# Patient Record
Sex: MALE | Race: WHITE | HISPANIC OR LATINO | URBAN - METROPOLITAN AREA
[De-identification: names, ages, dates, MRNs, and addresses within clinical notes are randomized per-mention and may not be internally consistent; named-entity substitution may affect disease eponyms.]

---

## 2017-01-06 ENCOUNTER — OUTPATIENT (OUTPATIENT)
Dept: OUTPATIENT SERVICES | Facility: HOSPITAL | Age: 62
LOS: 1 days | End: 2017-01-06
Payer: MEDICARE

## 2017-01-06 ENCOUNTER — APPOINTMENT (OUTPATIENT)
Age: 62
End: 2017-01-06

## 2017-01-06 DIAGNOSIS — Z94.4 LIVER TRANSPLANT STATUS: ICD-10-CM

## 2017-01-06 DIAGNOSIS — Z98.89 OTHER SPECIFIED POSTPROCEDURAL STATES: Chronic | ICD-10-CM

## 2017-01-06 PROCEDURE — 43235 EGD DIAGNOSTIC BRUSH WASH: CPT | Mod: GC

## 2017-01-06 PROCEDURE — 45378 DIAGNOSTIC COLONOSCOPY: CPT

## 2017-01-06 PROCEDURE — 43235 EGD DIAGNOSTIC BRUSH WASH: CPT

## 2017-01-06 PROCEDURE — 45378 DIAGNOSTIC COLONOSCOPY: CPT | Mod: GC

## 2017-01-28 ENCOUNTER — TRANSCRIPTION ENCOUNTER (OUTPATIENT)
Age: 62
End: 2017-01-28

## 2017-03-27 ENCOUNTER — APPOINTMENT (OUTPATIENT)
Dept: ENDOCRINOLOGY | Facility: CLINIC | Age: 62
End: 2017-03-27

## 2017-03-27 VITALS
SYSTOLIC BLOOD PRESSURE: 140 MMHG | HEIGHT: 68 IN | WEIGHT: 190 LBS | BODY MASS INDEX: 28.79 KG/M2 | OXYGEN SATURATION: 97 % | HEART RATE: 68 BPM | DIASTOLIC BLOOD PRESSURE: 70 MMHG

## 2017-03-27 LAB
GLUCOSE BLDC GLUCOMTR-MCNC: 202
HBA1C MFR BLD HPLC: 8.1

## 2017-06-30 ENCOUNTER — MEDICATION RENEWAL (OUTPATIENT)
Age: 62
End: 2017-06-30

## 2017-07-24 ENCOUNTER — LABORATORY RESULT (OUTPATIENT)
Age: 62
End: 2017-07-24

## 2017-07-24 ENCOUNTER — APPOINTMENT (OUTPATIENT)
Dept: ENDOCRINOLOGY | Facility: CLINIC | Age: 62
End: 2017-07-24

## 2017-07-24 VITALS
OXYGEN SATURATION: 97 % | BODY MASS INDEX: 29.1 KG/M2 | DIASTOLIC BLOOD PRESSURE: 70 MMHG | HEART RATE: 70 BPM | SYSTOLIC BLOOD PRESSURE: 130 MMHG | WEIGHT: 192 LBS | HEIGHT: 68 IN

## 2017-07-24 LAB
GLUCOSE BLDC GLUCOMTR-MCNC: 252
HBA1C MFR BLD HPLC: 9.3
HBA1C MFR BLD HPLC: 9.3

## 2017-07-28 LAB
ALBUMIN SERPL ELPH-MCNC: 4.4 G/DL
ALP BLD-CCNC: 117 U/L
ALT SERPL-CCNC: 21 U/L
ANION GAP SERPL CALC-SCNC: 12 MMOL/L
AST SERPL-CCNC: 25 U/L
BILIRUB SERPL-MCNC: 0.5 MG/DL
BUN SERPL-MCNC: 34 MG/DL
CALCIUM SERPL-MCNC: 9.6 MG/DL
CHLORIDE SERPL-SCNC: 103 MMOL/L
CHOLEST SERPL-MCNC: 247 MG/DL
CHOLEST/HDLC SERPL: 6.2 RATIO
CO2 SERPL-SCNC: 23 MMOL/L
CREAT SERPL-MCNC: 2.05 MG/DL
CREAT SPEC-SCNC: 107 MG/DL
GLUCOSE SERPL-MCNC: 300 MG/DL
HDLC SERPL-MCNC: 40 MG/DL
LDLC SERPL CALC-MCNC: 130 MG/DL
MICROALBUMIN 24H UR DL<=1MG/L-MCNC: 6.9 MG/DL
MICROALBUMIN/CREAT 24H UR-RTO: 64 MG/G
POTASSIUM SERPL-SCNC: 5.5 MMOL/L
PROT SERPL-MCNC: 7.7 G/DL
SODIUM SERPL-SCNC: 138 MMOL/L
T3RU NFR SERPL: 0.93 INDEX
T4 SERPL-MCNC: 6.6 UG/DL
TRIGL SERPL-MCNC: 387 MG/DL
TSH SERPL-ACNC: 0.52 UIU/ML

## 2017-10-19 ENCOUNTER — APPOINTMENT (OUTPATIENT)
Dept: ENDOCRINOLOGY | Facility: CLINIC | Age: 62
End: 2017-10-19
Payer: MEDICARE

## 2017-10-19 VITALS
DIASTOLIC BLOOD PRESSURE: 78 MMHG | OXYGEN SATURATION: 98 % | HEART RATE: 72 BPM | WEIGHT: 187 LBS | HEIGHT: 68 IN | BODY MASS INDEX: 28.34 KG/M2 | SYSTOLIC BLOOD PRESSURE: 130 MMHG

## 2017-10-19 LAB
GLUCOSE BLDC GLUCOMTR-MCNC: 97
HBA1C MFR BLD HPLC: 7.3

## 2017-10-19 PROCEDURE — 83036 HEMOGLOBIN GLYCOSYLATED A1C: CPT | Mod: QW

## 2017-10-19 PROCEDURE — 82962 GLUCOSE BLOOD TEST: CPT

## 2017-10-19 PROCEDURE — 99214 OFFICE O/P EST MOD 30 MIN: CPT | Mod: 25

## 2017-11-20 ENCOUNTER — APPOINTMENT (OUTPATIENT)
Dept: GASTROENTEROLOGY | Facility: CLINIC | Age: 62
End: 2017-11-20
Payer: MEDICARE

## 2017-11-20 VITALS
OXYGEN SATURATION: 98 % | BODY MASS INDEX: 28.19 KG/M2 | HEART RATE: 64 BPM | WEIGHT: 186 LBS | TEMPERATURE: 97.9 F | SYSTOLIC BLOOD PRESSURE: 124 MMHG | DIASTOLIC BLOOD PRESSURE: 70 MMHG | HEIGHT: 68 IN | RESPIRATION RATE: 15 BRPM

## 2017-11-20 DIAGNOSIS — K92.1 MELENA: ICD-10-CM

## 2017-11-20 PROCEDURE — 99203 OFFICE O/P NEW LOW 30 MIN: CPT

## 2017-12-18 ENCOUNTER — APPOINTMENT (OUTPATIENT)
Dept: GASTROENTEROLOGY | Facility: CLINIC | Age: 62
End: 2017-12-18

## 2017-12-21 ENCOUNTER — RX RENEWAL (OUTPATIENT)
Age: 62
End: 2017-12-21

## 2018-01-11 ENCOUNTER — APPOINTMENT (OUTPATIENT)
Dept: ENDOCRINOLOGY | Facility: CLINIC | Age: 63
End: 2018-01-11
Payer: MEDICARE

## 2018-01-11 VITALS
HEART RATE: 72 BPM | HEIGHT: 68 IN | WEIGHT: 185 LBS | DIASTOLIC BLOOD PRESSURE: 80 MMHG | BODY MASS INDEX: 28.04 KG/M2 | SYSTOLIC BLOOD PRESSURE: 110 MMHG | OXYGEN SATURATION: 98 %

## 2018-01-11 LAB
GLUCOSE BLDC GLUCOMTR-MCNC: 121
HBA1C MFR BLD HPLC: 5.1

## 2018-01-11 PROCEDURE — 99214 OFFICE O/P EST MOD 30 MIN: CPT | Mod: 25

## 2018-01-11 PROCEDURE — 82962 GLUCOSE BLOOD TEST: CPT

## 2018-01-11 PROCEDURE — 83036 HEMOGLOBIN GLYCOSYLATED A1C: CPT | Mod: QW

## 2018-01-25 ENCOUNTER — APPOINTMENT (OUTPATIENT)
Age: 63
End: 2018-01-25
Payer: MEDICARE

## 2018-01-25 VITALS
TEMPERATURE: 98.3 F | RESPIRATION RATE: 16 BRPM | WEIGHT: 180 LBS | BODY MASS INDEX: 27.28 KG/M2 | DIASTOLIC BLOOD PRESSURE: 101 MMHG | HEART RATE: 71 BPM | HEIGHT: 68 IN | SYSTOLIC BLOOD PRESSURE: 150 MMHG

## 2018-01-25 DIAGNOSIS — Z87.19 PERSONAL HISTORY OF OTHER DISEASES OF THE DIGESTIVE SYSTEM: ICD-10-CM

## 2018-01-25 DIAGNOSIS — K72.90 HEPATIC FAILURE, UNSPECIFIED W/OUT COMA: ICD-10-CM

## 2018-01-25 PROCEDURE — 99214 OFFICE O/P EST MOD 30 MIN: CPT

## 2018-02-07 ENCOUNTER — APPOINTMENT (OUTPATIENT)
Age: 63
End: 2018-02-07

## 2018-02-12 ENCOUNTER — APPOINTMENT (OUTPATIENT)
Dept: GASTROENTEROLOGY | Facility: CLINIC | Age: 63
End: 2018-02-12
Payer: MEDICARE

## 2018-02-12 VITALS
OXYGEN SATURATION: 98 % | BODY MASS INDEX: 27.74 KG/M2 | DIASTOLIC BLOOD PRESSURE: 70 MMHG | RESPIRATION RATE: 16 BRPM | TEMPERATURE: 98.4 F | HEART RATE: 69 BPM | SYSTOLIC BLOOD PRESSURE: 130 MMHG | HEIGHT: 68 IN | WEIGHT: 183 LBS

## 2018-02-12 DIAGNOSIS — D64.9 ANEMIA, UNSPECIFIED: ICD-10-CM

## 2018-02-12 PROCEDURE — 91110 GI TRC IMG INTRAL ESOPH-ILE: CPT

## 2018-02-14 ENCOUNTER — RX RENEWAL (OUTPATIENT)
Age: 63
End: 2018-02-14

## 2018-02-14 ENCOUNTER — RESULT REVIEW (OUTPATIENT)
Age: 63
End: 2018-02-14

## 2018-02-20 ENCOUNTER — OTHER (OUTPATIENT)
Age: 63
End: 2018-02-20

## 2018-03-20 ENCOUNTER — APPOINTMENT (OUTPATIENT)
Dept: ENDOCRINOLOGY | Facility: CLINIC | Age: 63
End: 2018-03-20
Payer: MEDICARE

## 2018-03-20 VITALS
SYSTOLIC BLOOD PRESSURE: 126 MMHG | OXYGEN SATURATION: 98 % | BODY MASS INDEX: 28.95 KG/M2 | HEIGHT: 68 IN | WEIGHT: 191 LBS | HEART RATE: 71 BPM | DIASTOLIC BLOOD PRESSURE: 78 MMHG

## 2018-03-20 DIAGNOSIS — E11.65 TYPE 2 DIABETES MELLITUS WITH HYPERGLYCEMIA: ICD-10-CM

## 2018-03-20 PROCEDURE — 99214 OFFICE O/P EST MOD 30 MIN: CPT

## 2018-05-10 ENCOUNTER — APPOINTMENT (OUTPATIENT)
Dept: HEPATOLOGY | Facility: CLINIC | Age: 63
End: 2018-05-10

## 2018-07-16 ENCOUNTER — APPOINTMENT (OUTPATIENT)
Dept: ENDOCRINOLOGY | Facility: CLINIC | Age: 63
End: 2018-07-16
Payer: MEDICARE

## 2018-07-16 VITALS — SYSTOLIC BLOOD PRESSURE: 124 MMHG | DIASTOLIC BLOOD PRESSURE: 76 MMHG | OXYGEN SATURATION: 96 % | HEART RATE: 74 BPM

## 2018-07-16 VITALS — WEIGHT: 190 LBS | HEIGHT: 68 IN | BODY MASS INDEX: 28.79 KG/M2

## 2018-07-16 LAB
GLUCOSE BLDC GLUCOMTR-MCNC: 169
HBA1C MFR BLD HPLC: 6

## 2018-07-16 PROCEDURE — 83036 HEMOGLOBIN GLYCOSYLATED A1C: CPT | Mod: QW,GC

## 2018-07-16 PROCEDURE — 99214 OFFICE O/P EST MOD 30 MIN: CPT | Mod: 25,GC

## 2018-07-16 PROCEDURE — 95250 CONT GLUC MNTR PHYS/QHP EQP: CPT | Mod: GC

## 2018-07-16 PROCEDURE — 95251 CONT GLUC MNTR ANALYSIS I&R: CPT | Mod: GC

## 2018-07-16 PROCEDURE — 82962 GLUCOSE BLOOD TEST: CPT | Mod: GC

## 2018-07-16 RX ORDER — INSULIN GLARGINE 100 [IU]/ML
100 INJECTION, SOLUTION SUBCUTANEOUS
Qty: 15 | Refills: 1 | Status: DISCONTINUED | COMMUNITY
Start: 2017-07-28 | End: 2018-07-16

## 2018-07-24 LAB
ALBUMIN SERPL ELPH-MCNC: 4.3 G/DL
ALP BLD-CCNC: 111 U/L
ALT SERPL-CCNC: 17 U/L
ANION GAP SERPL CALC-SCNC: 11 MMOL/L
AST SERPL-CCNC: 23 U/L
BILIRUB SERPL-MCNC: 0.3 MG/DL
BUN SERPL-MCNC: 26 MG/DL
CALCIUM SERPL-MCNC: 9.1 MG/DL
CHLORIDE SERPL-SCNC: 108 MMOL/L
CHOLEST SERPL-MCNC: 271 MG/DL
CHOLEST/HDLC SERPL: 7.1 RATIO
CO2 SERPL-SCNC: 22 MMOL/L
CREAT SERPL-MCNC: 1.99 MG/DL
CREAT SPEC-SCNC: 174 MG/DL
GLUCOSE SERPL-MCNC: 80 MG/DL
HDLC SERPL-MCNC: 38 MG/DL
LDLC SERPL CALC-MCNC: 180 MG/DL
MICROALBUMIN 24H UR DL<=1MG/L-MCNC: 143.7 MG/DL
MICROALBUMIN/CREAT 24H UR-RTO: 825 MG/G
POTASSIUM SERPL-SCNC: 4.8 MMOL/L
PROT SERPL-MCNC: 6.9 G/DL
SODIUM SERPL-SCNC: 141 MMOL/L
TRIGL SERPL-MCNC: 263 MG/DL
TSH SERPL-ACNC: 0.94 UIU/ML

## 2018-08-03 ENCOUNTER — CLINICAL ADVICE (OUTPATIENT)
Age: 63
End: 2018-08-03

## 2018-10-29 ENCOUNTER — APPOINTMENT (OUTPATIENT)
Dept: ENDOCRINOLOGY | Facility: CLINIC | Age: 63
End: 2018-10-29
Payer: MEDICARE

## 2018-10-29 VITALS
HEIGHT: 68 IN | OXYGEN SATURATION: 96 % | WEIGHT: 185 LBS | SYSTOLIC BLOOD PRESSURE: 116 MMHG | HEART RATE: 78 BPM | BODY MASS INDEX: 28.04 KG/M2 | DIASTOLIC BLOOD PRESSURE: 80 MMHG

## 2018-10-29 LAB
GLUCOSE BLDC GLUCOMTR-MCNC: 94
HBA1C MFR BLD HPLC: 5.9

## 2018-10-29 PROCEDURE — 99214 OFFICE O/P EST MOD 30 MIN: CPT | Mod: 25

## 2018-10-29 PROCEDURE — G0008: CPT

## 2018-10-29 PROCEDURE — 90686 IIV4 VACC NO PRSV 0.5 ML IM: CPT

## 2018-10-29 PROCEDURE — 83036 HEMOGLOBIN GLYCOSYLATED A1C: CPT | Mod: QW

## 2018-10-29 PROCEDURE — 82962 GLUCOSE BLOOD TEST: CPT

## 2019-01-04 ENCOUNTER — RX RENEWAL (OUTPATIENT)
Age: 64
End: 2019-01-04

## 2019-02-04 ENCOUNTER — RESULT CHARGE (OUTPATIENT)
Age: 64
End: 2019-02-04

## 2019-02-04 ENCOUNTER — APPOINTMENT (OUTPATIENT)
Dept: ENDOCRINOLOGY | Facility: CLINIC | Age: 64
End: 2019-02-04
Payer: MEDICARE

## 2019-02-04 VITALS
WEIGHT: 187 LBS | BODY MASS INDEX: 28.34 KG/M2 | HEIGHT: 68 IN | HEART RATE: 85 BPM | DIASTOLIC BLOOD PRESSURE: 62 MMHG | SYSTOLIC BLOOD PRESSURE: 124 MMHG | OXYGEN SATURATION: 98 %

## 2019-02-04 LAB
GLUCOSE BLDC GLUCOMTR-MCNC: 235
HBA1C MFR BLD HPLC: 5.9

## 2019-02-04 PROCEDURE — 82962 GLUCOSE BLOOD TEST: CPT

## 2019-02-04 PROCEDURE — 99214 OFFICE O/P EST MOD 30 MIN: CPT

## 2019-02-04 PROCEDURE — 83036 HEMOGLOBIN GLYCOSYLATED A1C: CPT | Mod: QW

## 2019-02-07 LAB
CHOLEST SERPL-MCNC: 231 MG/DL
CHOLEST/HDLC SERPL: 6.1 RATIO
HDLC SERPL-MCNC: 38 MG/DL
LDLC SERPL CALC-MCNC: 128 MG/DL
TRIGL SERPL-MCNC: 324 MG/DL

## 2019-05-13 ENCOUNTER — APPOINTMENT (OUTPATIENT)
Dept: ENDOCRINOLOGY | Facility: CLINIC | Age: 64
End: 2019-05-13
Payer: MEDICARE

## 2019-05-13 VITALS
OXYGEN SATURATION: 98 % | HEART RATE: 68 BPM | WEIGHT: 190 LBS | SYSTOLIC BLOOD PRESSURE: 130 MMHG | BODY MASS INDEX: 28.79 KG/M2 | DIASTOLIC BLOOD PRESSURE: 70 MMHG | HEIGHT: 68 IN

## 2019-05-13 LAB
GLUCOSE BLDC GLUCOMTR-MCNC: 89
HBA1C MFR BLD HPLC: 6.1

## 2019-05-13 PROCEDURE — 82962 GLUCOSE BLOOD TEST: CPT

## 2019-05-13 PROCEDURE — 99214 OFFICE O/P EST MOD 30 MIN: CPT | Mod: 25

## 2019-05-13 PROCEDURE — 83036 HEMOGLOBIN GLYCOSYLATED A1C: CPT | Mod: QW

## 2019-05-13 RX ORDER — BLOOD SUGAR DIAGNOSTIC
STRIP MISCELLANEOUS
Qty: 100 | Refills: 3 | Status: ACTIVE | COMMUNITY
Start: 2019-05-13 | End: 1900-01-01

## 2019-05-16 LAB
ANION GAP SERPL CALC-SCNC: 11 MMOL/L
BUN SERPL-MCNC: 31 MG/DL
CALCIUM SERPL-MCNC: 9.7 MG/DL
CHLORIDE SERPL-SCNC: 107 MMOL/L
CHOLEST SERPL-MCNC: 229 MG/DL
CHOLEST/HDLC SERPL: 4.4 RATIO
CO2 SERPL-SCNC: 22 MMOL/L
CREAT SERPL-MCNC: 2.22 MG/DL
GLUCOSE SERPL-MCNC: 90 MG/DL
HDLC SERPL-MCNC: 52 MG/DL
LDLC SERPL CALC-MCNC: 149 MG/DL
POTASSIUM SERPL-SCNC: 5.3 MMOL/L
SODIUM SERPL-SCNC: 140 MMOL/L
TRIGL SERPL-MCNC: 138 MG/DL
TSH SERPL-ACNC: 0.85 UIU/ML

## 2019-08-13 ENCOUNTER — APPOINTMENT (OUTPATIENT)
Dept: ENDOCRINOLOGY | Facility: CLINIC | Age: 64
End: 2019-08-13
Payer: MEDICARE

## 2019-08-13 VITALS
OXYGEN SATURATION: 98 % | DIASTOLIC BLOOD PRESSURE: 76 MMHG | BODY MASS INDEX: 27.74 KG/M2 | HEART RATE: 70 BPM | WEIGHT: 183 LBS | SYSTOLIC BLOOD PRESSURE: 124 MMHG | HEIGHT: 68 IN

## 2019-08-13 DIAGNOSIS — Z94.4 LIVER TRANSPLANT STATUS: ICD-10-CM

## 2019-08-13 PROCEDURE — 83036 HEMOGLOBIN GLYCOSYLATED A1C: CPT | Mod: QW

## 2019-08-13 PROCEDURE — 99214 OFFICE O/P EST MOD 30 MIN: CPT

## 2019-08-13 RX ORDER — PRAVASTATIN SODIUM 40 MG/1
40 TABLET ORAL DAILY
Qty: 90 | Refills: 3 | Status: DISCONTINUED | COMMUNITY
Start: 2018-10-05 | End: 2019-08-13

## 2019-08-14 LAB — HBA1C MFR BLD HPLC: 6.6

## 2019-08-14 NOTE — DATA REVIEWED
[FreeTextEntry1] : poct hba1c 6.6\par \par Pt reports recent cr 2.4\par \par \par 10/2018\par cr 2.16\par \par \par Thyroid ultrasound 7/2018 - stable b/l nodules\par \par FNA 2016: benign findings, [facility]

## 2019-08-14 NOTE — ASSESSMENT
[FreeTextEntry1] : 63 year old man post liver transplant with pre-existing T2 diabetes, well controlled.\par  - continue Januvia.  \par - retinopathy screening up to date \par  - increase frequency of glucose monitoring\par \par \par HTN\par - BP at goal, microalbumin pos,  Has ckd, followed by nephrology, unsure if on  ACE/ARB, will call back with med list ( has had cough)\par \par \par Hyperlipidemia\par   - LDL elevated on last blood tests, will switch to atorvastatin\par - repeat lipids next visit\par \par  Thyroid nodules\par - stable on ultrasound, benign fna in the past.   \par   - repeat ultrasound ~6 months\par  - clinically and biochemically euthyroid\par \par F/u 3 months\par

## 2019-08-14 NOTE — REVIEW OF SYSTEMS
[Shortness Of Breath] : shortness of breath [Chest Pain] : no chest pain [FreeTextEntry4] : + cough [Nausea] : no nausea

## 2019-08-14 NOTE — HISTORY OF PRESENT ILLNESS
[FreeTextEntry1] : cc: Diabetes\par \par  63 year old man with T2DM, well controlled, post liver transplant. He checks glucose every few days in am and values are generally  100 - 120 mg/dl, does not check later in the day. No hypoglycemia. Takes Januvia.  Has cut down on  carbohydrate intake  Exercise:  going to the gym every day, lifts weights. BUt was not able to for the past few weeks, first had epidural injection in his back, then had cataract surgery. Last optho visit this month, just had cataract surgery, no known retinopathy. , has neuropathy. \par \par Thyroid nodules: Benign FNA in the past.    He reports no change in his thyroid and no dysphagia or dyspnea.  \par \par Hypertension: blood pressure has been controlled, He has  proteinuria, still unsure if taking ACE/ARB, did not bring list, has regular nephrology f/u\par \par Hyperlipidemia:taking pravastatin, spoke to his hepatologist who said it is ok to switch to atorvastatin

## 2019-08-14 NOTE — PHYSICAL EXAM
[Alert] : alert [No Acute Distress] : no acute distress [Well Nourished] : well nourished [Well Developed] : well developed [Normal Sclera/Conjunctiva] : normal sclera/conjunctiva [No Proptosis] : no proptosis [No LAD] : no lymphadenopathy [No Respiratory Distress] : no respiratory distress [Clear to Auscultation] : lungs were clear to auscultation bilaterally [Regular Rhythm] : with a regular rhythm [Normal S1, S2] : normal S1 and S2 [Not Tender] : non-tender [Normal Bowel Sounds] : normal bowel sounds [Soft] : abdomen soft [No Spinal Tenderness] : no spinal tenderness [Normal Strength/Tone] : muscle strength and tone were normal [Normal Reflexes] : deep tendon reflexes were 2+ and symmetric [No Tremors] : no tremors [Normal Affect] : the affect was normal [Normal Mood] : the mood was normal [de-identified] : + nodules [de-identified] : left ankle edema

## 2019-08-15 ENCOUNTER — RX RENEWAL (OUTPATIENT)
Age: 64
End: 2019-08-15

## 2019-08-15 RX ORDER — PEN NEEDLE, DIABETIC 29 G X1/2"
31G X 5 MM NEEDLE, DISPOSABLE MISCELLANEOUS
Qty: 1 | Refills: 3 | Status: DISCONTINUED | COMMUNITY
Start: 2017-07-28 | End: 2019-08-15

## 2019-08-16 ENCOUNTER — RX CHANGE (OUTPATIENT)
Age: 64
End: 2019-08-16

## 2019-08-16 RX ORDER — ASPIRIN ENTERIC COATED TABLETS 81 MG 81 MG/1
81 TABLET, DELAYED RELEASE ORAL
Qty: 30 | Refills: 5 | Status: ACTIVE | COMMUNITY
Start: 2019-08-16

## 2019-08-16 RX ORDER — FUROSEMIDE 40 MG/1
40 TABLET ORAL DAILY
Refills: 0 | Status: ACTIVE | COMMUNITY

## 2019-08-16 RX ORDER — EVEROLIMUS 0.5 MG/1
0.5 TABLET ORAL
Refills: 0 | Status: ACTIVE | COMMUNITY
Start: 2019-08-16

## 2019-08-16 RX ORDER — IBUPROFEN 200 MG
600 CAPSULE ORAL DAILY
Refills: 0 | Status: ACTIVE | COMMUNITY
Start: 2019-08-16

## 2019-08-16 RX ORDER — FOLIC ACID 1 MG/1
1 TABLET ORAL DAILY
Refills: 0 | Status: ACTIVE | COMMUNITY
Start: 2019-08-16

## 2019-08-16 RX ORDER — HYDRALAZINE HYDROCHLORIDE 50 MG/1
50 TABLET ORAL 3 TIMES DAILY
Refills: 0 | Status: ACTIVE | COMMUNITY

## 2019-09-17 ENCOUNTER — INBOUND DOCUMENT (OUTPATIENT)
Age: 64
End: 2019-09-17

## 2019-11-19 ENCOUNTER — APPOINTMENT (OUTPATIENT)
Dept: ENDOCRINOLOGY | Facility: CLINIC | Age: 64
End: 2019-11-19
Payer: MEDICARE

## 2019-11-19 VITALS
WEIGHT: 188 LBS | BODY MASS INDEX: 28.49 KG/M2 | HEART RATE: 60 BPM | DIASTOLIC BLOOD PRESSURE: 70 MMHG | SYSTOLIC BLOOD PRESSURE: 150 MMHG | HEIGHT: 68 IN | OXYGEN SATURATION: 98 %

## 2019-11-19 LAB
GLUCOSE BLDC GLUCOMTR-MCNC: 101
HBA1C MFR BLD HPLC: 6.1

## 2019-11-19 PROCEDURE — 83036 HEMOGLOBIN GLYCOSYLATED A1C: CPT | Mod: QW

## 2019-11-19 PROCEDURE — 82962 GLUCOSE BLOOD TEST: CPT

## 2019-11-19 PROCEDURE — 99214 OFFICE O/P EST MOD 30 MIN: CPT | Mod: 25

## 2019-11-20 LAB
ALBUMIN SERPL ELPH-MCNC: 4.6 G/DL
ALP BLD-CCNC: 88 U/L
ALT SERPL-CCNC: 9 U/L
ANION GAP SERPL CALC-SCNC: 15 MMOL/L
AST SERPL-CCNC: 16 U/L
BILIRUB SERPL-MCNC: 0.4 MG/DL
BUN SERPL-MCNC: 41 MG/DL
CALCIUM SERPL-MCNC: 9.7 MG/DL
CHLORIDE SERPL-SCNC: 107 MMOL/L
CHOLEST SERPL-MCNC: 182 MG/DL
CHOLEST/HDLC SERPL: 5.1 RATIO
CO2 SERPL-SCNC: 23 MMOL/L
CREAT SERPL-MCNC: 2.91 MG/DL
GLUCOSE SERPL-MCNC: 93 MG/DL
HDLC SERPL-MCNC: 36 MG/DL
LDLC SERPL CALC-MCNC: 100 MG/DL
POTASSIUM SERPL-SCNC: 4.3 MMOL/L
PROT SERPL-MCNC: 6.6 G/DL
SODIUM SERPL-SCNC: 145 MMOL/L
TRIGL SERPL-MCNC: 228 MG/DL

## 2019-11-20 NOTE — HISTORY OF PRESENT ILLNESS
[FreeTextEntry1] : cc: Diabetes\par \par  64 year old man with T2DM, well controlled, post liver transplant. He checks glucose twice daily, finds that glucose been a little higher than usual in the morning (forgot his log today).  Fasting glucose has been about 130 mg/dl and pm 150 - 160 mg/dl. No hypoglycemia.. No hypoglycemia.  No recent change in diet or exercise. Trying to limit carbohydrate intake. Takes Januvia. Goes to the gym every day, lifts weights.  Last optho visit about 6 months ago, has appt later this week. no known retinopathy. , has neuropathy. \par Had flu shot\par \par Has had shortness of breath, will have cardiac catheterization tomorrow\par \par Thyroid nodules: Benign FNA in the past.    He reports no change in his thyroid, no dysphagia or dyspnea.  \par \par Hypertension: blood pressure has been slightly elevated.  Cardiologist is waiting until cath to adjust medication.    Has regular nephrology f/u\par \par Hyperlipidemia:switched to atorvastatin

## 2019-11-20 NOTE — PHYSICAL EXAM
[Alert] : alert [No Acute Distress] : no acute distress [Well Nourished] : well nourished [Well Developed] : well developed [Normal Sclera/Conjunctiva] : normal sclera/conjunctiva [No Proptosis] : no proptosis [Thyroid Not Enlarged] : the thyroid was not enlarged [No Respiratory Distress] : no respiratory distress [Normal S1, S2] : normal S1 and S2 [Clear to Auscultation] : lungs were clear to auscultation bilaterally [Regular Rhythm] : with a regular rhythm [Soft] : abdomen soft [Normal Bowel Sounds] : normal bowel sounds [No Spinal Tenderness] : no spinal tenderness [Not Tender] : non-tender [No Tremors] : no tremors [Normal Reflexes] : deep tendon reflexes were 2+ and symmetric [Normal Strength/Tone] : muscle strength and tone were normal [Normal Mood] : the mood was normal [Normal Affect] : the affect was normal [de-identified] : LE edema [de-identified] : + nodules

## 2019-11-20 NOTE — ASSESSMENT
[FreeTextEntry1] : 64 year old man post liver transplant with pre-existing T2 diabetes, well controlled.\par  - continue Januvia, \par  - check cmp, consider switching to sglt-2\par - retinopathy screening up to date \par  - had flu shot\par \par \par HTN\par - BP mildly elevated today, has cardiac cath and cardiology follow up, plan is adjust medication afterward. - microalbumin pos,  Has ckd, followed by nephrology, \par \par \par Hyperlipidemia\par   - check lipids, adjust atorvastatin as needed\par \par  Thyroid nodules\par - stable on ultrasound, benign fna in the past.   \par   - repeat ultrasound ~3 months\par  - clinically and biochemically euthyroid\par \par F/u 3 months

## 2019-12-17 ENCOUNTER — RX RENEWAL (OUTPATIENT)
Age: 64
End: 2019-12-17

## 2020-02-13 ENCOUNTER — OUTPATIENT (OUTPATIENT)
Dept: OUTPATIENT SERVICES | Facility: HOSPITAL | Age: 65
LOS: 1 days | End: 2020-02-13
Payer: MEDICARE

## 2020-02-13 DIAGNOSIS — D63.8 ANEMIA IN OTHER CHRONIC DISEASES CLASSIFIED ELSEWHERE: ICD-10-CM

## 2020-02-13 DIAGNOSIS — Z00.00 ENCOUNTER FOR GENERAL ADULT MEDICAL EXAMINATION WITHOUT ABNORMAL FINDINGS: ICD-10-CM

## 2020-02-13 DIAGNOSIS — Z98.89 OTHER SPECIFIED POSTPROCEDURAL STATES: Chronic | ICD-10-CM

## 2020-02-13 PROCEDURE — 86901 BLOOD TYPING SEROLOGIC RH(D): CPT

## 2020-02-13 PROCEDURE — 85027 COMPLETE CBC AUTOMATED: CPT

## 2020-02-13 PROCEDURE — 86923 COMPATIBILITY TEST ELECTRIC: CPT

## 2020-02-13 PROCEDURE — 86900 BLOOD TYPING SEROLOGIC ABO: CPT

## 2020-02-13 PROCEDURE — 86850 RBC ANTIBODY SCREEN: CPT

## 2020-02-13 PROCEDURE — 36415 COLL VENOUS BLD VENIPUNCTURE: CPT

## 2020-02-14 ENCOUNTER — OUTPATIENT (OUTPATIENT)
Dept: OUTPATIENT SERVICES | Facility: HOSPITAL | Age: 65
LOS: 1 days | End: 2020-02-14
Payer: MEDICARE

## 2020-02-14 VITALS
OXYGEN SATURATION: 97 % | HEART RATE: 58 BPM | TEMPERATURE: 98 F | SYSTOLIC BLOOD PRESSURE: 108 MMHG | RESPIRATION RATE: 16 BRPM | DIASTOLIC BLOOD PRESSURE: 63 MMHG

## 2020-02-14 VITALS
DIASTOLIC BLOOD PRESSURE: 67 MMHG | TEMPERATURE: 99 F | SYSTOLIC BLOOD PRESSURE: 127 MMHG | HEART RATE: 66 BPM | OXYGEN SATURATION: 98 % | RESPIRATION RATE: 18 BRPM | WEIGHT: 190.04 LBS | HEIGHT: 68 IN

## 2020-02-14 DIAGNOSIS — D63.8 ANEMIA IN OTHER CHRONIC DISEASES CLASSIFIED ELSEWHERE: ICD-10-CM

## 2020-02-14 DIAGNOSIS — Z98.89 OTHER SPECIFIED POSTPROCEDURAL STATES: Chronic | ICD-10-CM

## 2020-02-14 LAB
HCT VFR BLD CALC: 24.6 % — LOW (ref 39–50)
HGB BLD-MCNC: 7.4 G/DL — LOW (ref 13–17)
MCHC RBC-ENTMCNC: 26.7 PG — LOW (ref 27–34)
MCHC RBC-ENTMCNC: 30.1 GM/DL — LOW (ref 32–36)
MCV RBC AUTO: 88.8 FL — SIGNIFICANT CHANGE UP (ref 80–100)
NRBC # BLD: 0 /100 WBCS — SIGNIFICANT CHANGE UP (ref 0–0)
PLATELET # BLD AUTO: 84 K/UL — LOW (ref 150–400)
RBC # BLD: 2.77 M/UL — LOW (ref 4.2–5.8)
RBC # FLD: 16 % — HIGH (ref 10.3–14.5)
WBC # BLD: 2.59 K/UL — LOW (ref 3.8–10.5)
WBC # FLD AUTO: 2.59 K/UL — LOW (ref 3.8–10.5)

## 2020-02-14 PROCEDURE — 36430 TRANSFUSION BLD/BLD COMPNT: CPT

## 2020-02-14 PROCEDURE — P9040: CPT

## 2020-02-14 PROCEDURE — 36415 COLL VENOUS BLD VENIPUNCTURE: CPT

## 2020-02-14 PROCEDURE — 86645 CMV ANTIBODY IGM: CPT

## 2020-02-14 PROCEDURE — 86644 CMV ANTIBODY: CPT

## 2020-02-14 PROCEDURE — 85027 COMPLETE CBC AUTOMATED: CPT

## 2020-02-14 RX ORDER — MYCOPHENOLIC ACID 180 MG/1
550 TABLET, DELAYED RELEASE ORAL
Qty: 0 | Refills: 0 | DISCHARGE

## 2020-02-14 RX ORDER — ACETAMINOPHEN 500 MG
325 TABLET ORAL ONCE
Refills: 0 | Status: COMPLETED | OUTPATIENT
Start: 2020-02-14 | End: 2020-02-14

## 2020-02-14 RX ORDER — VITAMIN E 100 UNIT
1 CAPSULE ORAL
Qty: 0 | Refills: 0 | DISCHARGE

## 2020-02-14 RX ORDER — HYDRALAZINE HCL 50 MG
1 TABLET ORAL
Qty: 0 | Refills: 0 | DISCHARGE

## 2020-02-14 RX ORDER — FUROSEMIDE 40 MG
1 TABLET ORAL
Qty: 0 | Refills: 0 | DISCHARGE

## 2020-02-14 RX ORDER — EVEROLIMUS 10 MG/1
2 TABLET ORAL
Qty: 0 | Refills: 0 | DISCHARGE

## 2020-02-14 RX ORDER — DIPHENHYDRAMINE HCL 50 MG
12.5 CAPSULE ORAL ONCE
Refills: 0 | Status: COMPLETED | OUTPATIENT
Start: 2020-02-14 | End: 2020-02-14

## 2020-02-14 RX ORDER — METOPROLOL TARTRATE 50 MG
1 TABLET ORAL
Qty: 0 | Refills: 0 | DISCHARGE

## 2020-02-14 RX ADMIN — Medication 12.5 MILLIGRAM(S): at 09:50

## 2020-02-14 RX ADMIN — Medication 325 MILLIGRAM(S): at 09:45

## 2020-02-15 LAB
CMV IGG FLD QL: >10 U/ML — HIGH
CMV IGG SERPL-IMP: POSITIVE
CMV IGM FLD-ACNC: <8 AU/ML — SIGNIFICANT CHANGE UP
CMV IGM SERPL QL: NEGATIVE — SIGNIFICANT CHANGE UP

## 2020-02-18 ENCOUNTER — APPOINTMENT (OUTPATIENT)
Dept: ENDOCRINOLOGY | Facility: CLINIC | Age: 65
End: 2020-02-18
Payer: MEDICARE

## 2020-02-18 VITALS — DIASTOLIC BLOOD PRESSURE: 60 MMHG | SYSTOLIC BLOOD PRESSURE: 110 MMHG

## 2020-02-18 VITALS
HEIGHT: 68 IN | DIASTOLIC BLOOD PRESSURE: 80 MMHG | WEIGHT: 190 LBS | BODY MASS INDEX: 28.79 KG/M2 | HEART RATE: 64 BPM | SYSTOLIC BLOOD PRESSURE: 140 MMHG | OXYGEN SATURATION: 97 %

## 2020-02-18 LAB
GLUCOSE BLDC GLUCOMTR-MCNC: 111
HBA1C MFR BLD HPLC: 5.7

## 2020-02-18 PROCEDURE — 82962 GLUCOSE BLOOD TEST: CPT

## 2020-02-18 PROCEDURE — 99214 OFFICE O/P EST MOD 30 MIN: CPT | Mod: 25

## 2020-02-18 PROCEDURE — 83036 HEMOGLOBIN GLYCOSYLATED A1C: CPT | Mod: QW

## 2020-02-19 LAB — FRUCTOSAMINE SERPL-MCNC: 268 UMOL/L

## 2020-02-19 NOTE — PHYSICAL EXAM
[No Acute Distress] : no acute distress [Alert] : alert [Well Developed] : well developed [Well Nourished] : well nourished [No Proptosis] : no proptosis [Normal Sclera/Conjunctiva] : normal sclera/conjunctiva [No LAD] : no lymphadenopathy [No Respiratory Distress] : no respiratory distress [Normal S1, S2] : normal S1 and S2 [Clear to Auscultation] : lungs were clear to auscultation bilaterally [Regular Rhythm] : with a regular rhythm [Normal Bowel Sounds] : normal bowel sounds [Not Tender] : non-tender [Soft] : abdomen soft [No Spinal Tenderness] : no spinal tenderness [Normal Strength/Tone] : muscle strength and tone were normal [Normal Reflexes] : deep tendon reflexes were 2+ and symmetric [Normal Affect] : the affect was normal [No Tremors] : no tremors [Normal Mood] : the mood was normal [de-identified] : + nodules [de-identified] : b/l 2+ pitting LE edema

## 2020-02-19 NOTE — REVIEW OF SYSTEMS
[Shortness Of Breath] : shortness of breath [Cough] : cough [Chest Pain] : no chest pain [Nausea] : no nausea

## 2020-02-19 NOTE — ASSESSMENT
[FreeTextEntry1] : 64 year old man post liver transplant with pre-existing T2 diabetes, well controlled.\par  - continue Januvia, \par - retinopathy screening up to date \par  - had flu shot\par  - had recent blood transfusion, will check fructosamine\par \par HTN, with ckd, followed by nephrology.  Continue current regimen\par \par Hyperlipidemia\par   - continue atorvastatin \par \par  Thyroid nodules\par - stable on ultrasound, benign fna in the past.   \par   - repeat ultrasound to monitor for growth\par  - clinically and biochemically euthyroid, repeat tfts next visit\par \par F/u 3 months

## 2020-02-19 NOTE — HISTORY OF PRESENT ILLNESS
[FreeTextEntry1] : cc: Diabetes\par \par  64 year old man with T2DM, well controlled, post liver transplant. He has not been been checking glucose  No symptoms of hypoglycemia.  Has been working out (weights) 4 times per week.  No "cardio" due to shortness of breath.   Not really limiting carbohydrate intake. Takes Januvia.  Last optho visit about 3 months ago. no known retinopathy. , has neuropathy. \par Had flu shot\par \par had cardiac catheterization and needed stent, has been diagnosed with pulmonary htn\par \par Thyroid nodules: Benign FNA in the past.    He reports no change in his thyroid, no dysphagia or dyspnea.  \par \par Hypertension: blood pressure has been controlled\par \par Hyperlipidemia: taking atorvastatin

## 2020-02-25 ENCOUNTER — APPOINTMENT (OUTPATIENT)
Dept: ENDOCRINOLOGY | Facility: CLINIC | Age: 65
End: 2020-02-25
Payer: MEDICARE

## 2020-02-25 PROCEDURE — 95251 CONT GLUC MNTR ANALYSIS I&R: CPT

## 2020-02-25 PROCEDURE — 95250 CONT GLUC MNTR PHYS/QHP EQP: CPT

## 2020-02-25 PROCEDURE — ZZZZZ: CPT

## 2020-03-05 ENCOUNTER — APPOINTMENT (OUTPATIENT)
Dept: HEPATOLOGY | Facility: CLINIC | Age: 65
End: 2020-03-05

## 2020-05-19 ENCOUNTER — APPOINTMENT (OUTPATIENT)
Dept: ENDOCRINOLOGY | Facility: CLINIC | Age: 65
End: 2020-05-19
Payer: MEDICARE

## 2020-05-19 VITALS
OXYGEN SATURATION: 96 % | DIASTOLIC BLOOD PRESSURE: 66 MMHG | SYSTOLIC BLOOD PRESSURE: 120 MMHG | HEIGHT: 68 IN | BODY MASS INDEX: 26.98 KG/M2 | TEMPERATURE: 98.7 F | HEART RATE: 79 BPM | WEIGHT: 178 LBS

## 2020-05-19 LAB
GLUCOSE BLDC GLUCOMTR-MCNC: 166
HBA1C MFR BLD HPLC: 5.7

## 2020-05-19 PROCEDURE — 82962 GLUCOSE BLOOD TEST: CPT

## 2020-05-19 PROCEDURE — 83036 HEMOGLOBIN GLYCOSYLATED A1C: CPT | Mod: QW

## 2020-05-19 PROCEDURE — 99214 OFFICE O/P EST MOD 30 MIN: CPT | Mod: 25

## 2020-05-20 NOTE — PHYSICAL EXAM
[Healthy Appearance] : healthy appearance [Alert] : alert [No Acute Distress] : no acute distress [No Proptosis] : no proptosis [Normal Sclera/Conjunctiva] : normal sclera/conjunctiva [No LAD] : no lymphadenopathy [No Respiratory Distress] : no respiratory distress [Clear to Auscultation] : lungs were clear to auscultation bilaterally [Normal S1, S2] : normal S1 and S2 [No Edema] : no peripheral edema [Regular Rhythm] : with a regular rhythm [Normal Bowel Sounds] : normal bowel sounds [Not Tender] : non-tender [Soft] : abdomen soft [Normal Reflexes] : deep tendon reflexes were 2+ and symmetric [Normal Strength/Tone] : muscle strength and tone were normal [No Tremors] : no tremors [Normal Affect] : the affect was normal [Normal Mood] : the mood was normal [de-identified] : + nodules

## 2020-05-20 NOTE — DATA REVIEWED
[FreeTextEntry1] : Thyroid ultrasound 2/2020\par multiple b/l subcm nodules, one new, one slightly increased in size. LEft  2.7 cm nodule stable

## 2020-05-20 NOTE — ASSESSMENT
[FreeTextEntry1] : 64 year old man post liver transplant with pre-existing T2 diabetes, well controlled.\par  - Pt with anemia, getting procrit, will check fructosamine\par - continue to monitor off januvia\par - discussed limiting carbohydrate intake, encouraged pt to increase exercise\par - retinopathy screening up to date \par - check bmp ( recently checked, will send report)\par \par \par HTN, with ckd, Blood pressure at goal. followed by nephrology.  Continue current regimen\par \par Hyperlipidemia\par   - continue atorvastatin \par \par  Thyroid nodules\par -  benign fna in the past.   \par   - new nodule on last ultrasound, repeat ultrasound to monitor for growth one year from prior\par  - clinically  euthyroid, check tfts \par \par F/u 3 months

## 2020-05-20 NOTE — HISTORY OF PRESENT ILLNESS
[FreeTextEntry1] : cc: Diabetes\par \par  64 year old man with T2DM, well controlled, post liver transplant. He stopped the Januvia after CGm showed hypoglycemia, has been checking glucose twice daily and values have been 120 - 180 with occassional values in 200s if eats pizza or pasta.  No hypoglycemia.  Not currently taking any diabetes medication.  \par  Not really limiting carbohydrate intake. \par No exercise since staying home due to covid and limited due to sob from pulmonary htn.  Had recent sleep study showing no sleep apnea\par Last optho visit about 6 months ago. no known retinopathy. , has neuropathy. \par \par \par Thyroid nodules: Benign FNA in the past.    He has not noted any change in his thyroid, reports no dysphagia or dyspnea.  \par \par Hypertension: blood pressure has been controlled, no recent change in medication\par \par Hyperlipidemia: taking atorvastatin, no side effect

## 2020-05-21 LAB
FRUCTOSAMINE SERPL-MCNC: 324 UMOL/L
TSH SERPL-ACNC: 0.58 UIU/ML

## 2020-05-25 ENCOUNTER — TRANSCRIPTION ENCOUNTER (OUTPATIENT)
Age: 65
End: 2020-05-25

## 2020-08-03 ENCOUNTER — RX RENEWAL (OUTPATIENT)
Age: 65
End: 2020-08-03

## 2020-08-18 ENCOUNTER — APPOINTMENT (OUTPATIENT)
Dept: ENDOCRINOLOGY | Facility: CLINIC | Age: 65
End: 2020-08-18
Payer: MEDICARE

## 2020-08-18 VITALS
HEART RATE: 70 BPM | DIASTOLIC BLOOD PRESSURE: 60 MMHG | BODY MASS INDEX: 26.37 KG/M2 | SYSTOLIC BLOOD PRESSURE: 140 MMHG | WEIGHT: 174 LBS | OXYGEN SATURATION: 96 % | HEIGHT: 68 IN | TEMPERATURE: 98.3 F

## 2020-08-18 LAB — GLUCOSE BLDC GLUCOMTR-MCNC: 135

## 2020-08-18 PROCEDURE — 82962 GLUCOSE BLOOD TEST: CPT

## 2020-08-18 PROCEDURE — 99214 OFFICE O/P EST MOD 30 MIN: CPT | Mod: 25

## 2020-08-18 NOTE — ASSESSMENT
Review pended refill request.  It does not fall under a protocol.      Hx of herpes    Requested Prescriptions     Pending Prescriptions Disp Refills   • valACYclovir HCl 1 G Oral Tab 4 tablet 5     Si TABS AT ONE TIME THEN REPEAT 2 TABS 12 HOURS LATER [FreeTextEntry1] : 64 year old man post liver transplant with pre-existing T2 diabetes, well controlled based on reported home glucose testing\par  -check hba1c, fructosamine.  Consider resuming januvia\par -  encouraged pt to increase exercise\par - retinopathy screening up to date \par \par \par \par HTN, with ckd, Systolic Blood pressure mildly above goal. followed by nephrology.  Continue to monitor on current regimen\par \par Hyperlipidemia\par   - continue atorvastatin \par \par  Thyroid nodules\par  - clinically and biochemically euthyroid, \par -  benign fna in the past.   \par   - new nodule on last ultrasound, repeat ultrasound to monitor for growth one year from prior\par \par \par F/u 3 months

## 2020-08-18 NOTE — PHYSICAL EXAM
[Alert] : alert [Healthy Appearance] : healthy appearance [No Acute Distress] : no acute distress [Normal Sclera/Conjunctiva] : normal sclera/conjunctiva [No Proptosis] : no proptosis [No Respiratory Distress] : no respiratory distress [No LAD] : no lymphadenopathy [Regular Rhythm] : with a regular rhythm [Clear to Auscultation] : lungs were clear to auscultation bilaterally [Normal S1, S2] : normal S1 and S2 [Normal Bowel Sounds] : normal bowel sounds [Pedal Pulses Normal] : the pedal pulses are present [Not Tender] : non-tender [No Spinal Tenderness] : no spinal tenderness [Soft] : abdomen soft [No Involuntary Movements] : no involuntary movements were seen [Normal Strength/Tone] : muscle strength and tone were normal [Right Foot Was Examined] : right foot ~C was examined [Normal Reflexes] : deep tendon reflexes were 2+ and symmetric [Left Foot Was Examined] : left foot ~C was examined [No Tremors] : no tremors [Normal Affect] : the affect was normal [Normal Mood] : the mood was normal [Foot Ulcers] : no foot ulcers [de-identified] : left nodule [de-identified] : R RYLAND edema [de-identified] : Decreased sensation on monofilament testing

## 2020-08-18 NOTE — HISTORY OF PRESENT ILLNESS
[FreeTextEntry1] : cc: Diabetes\par \par  64 year old man with T2DM, well controlled, post liver transplant. Has been checking glucose everyother day and values have been 110 - 140 mg/dl.  No hypoglycemia.  Not currently taking any diabetes medication.  Trying to limit carbohydrate intake\par Plans to resume exercise today.   (last worked out in February)\par Last optho visit about 1 month ago. no known retinopathy. , has neuropathy. \par \par \par Thyroid nodules: He has had a benign FNA in the past.    Reports no change in his thyroid, no dysphagia or dyspnea.  \par \par Hypertension: blood pressure has been controlled, no recent change in regimen\par \par Hyperlipidemia: taking atorvastatin, no side effects\par \par Back on eliquis

## 2020-08-18 NOTE — DATA REVIEWED
[FreeTextEntry1] : 8/4/2020\par Cr 2.41\par \par Thyroid ultrasound 2/2020\par multiple b/l subcm nodules, one new, one slightly increased in size. LEft  2.7 cm nodule stable

## 2020-08-20 LAB
ESTIMATED AVERAGE GLUCOSE: 120 MG/DL
FRUCTOSAMINE SERPL-MCNC: 307 UMOL/L
HBA1C MFR BLD HPLC: 5.8 %

## 2020-08-23 ENCOUNTER — APPOINTMENT (OUTPATIENT)
Dept: DISASTER EMERGENCY | Facility: CLINIC | Age: 65
End: 2020-08-23

## 2020-08-23 ENCOUNTER — LABORATORY RESULT (OUTPATIENT)
Age: 65
End: 2020-08-23

## 2020-08-24 ENCOUNTER — APPOINTMENT (OUTPATIENT)
Dept: ENDOCRINOLOGY | Facility: CLINIC | Age: 65
End: 2020-08-24

## 2020-11-23 ENCOUNTER — APPOINTMENT (OUTPATIENT)
Dept: ENDOCRINOLOGY | Facility: CLINIC | Age: 65
End: 2020-11-23
Payer: MEDICARE

## 2020-11-23 VITALS
BODY MASS INDEX: 26.98 KG/M2 | WEIGHT: 178 LBS | OXYGEN SATURATION: 97 % | DIASTOLIC BLOOD PRESSURE: 80 MMHG | TEMPERATURE: 98.3 F | SYSTOLIC BLOOD PRESSURE: 136 MMHG | HEIGHT: 68 IN | HEART RATE: 79 BPM

## 2020-11-23 LAB
GLUCOSE BLDC GLUCOMTR-MCNC: 127
HBA1C MFR BLD HPLC: 5.9

## 2020-11-23 PROCEDURE — 99214 OFFICE O/P EST MOD 30 MIN: CPT | Mod: 25

## 2020-11-23 PROCEDURE — 83036 HEMOGLOBIN GLYCOSYLATED A1C: CPT | Mod: QW

## 2020-11-23 PROCEDURE — 82962 GLUCOSE BLOOD TEST: CPT

## 2020-11-23 NOTE — HISTORY OF PRESENT ILLNESS
[FreeTextEntry1] : cc: Diabetes\par \par 65 year old man with T2DM, well controlled, post liver transplant. Has been checking glucose every other day and values have been 140 - 220s mg/dl.  No hypoglycemia.  Not currently taking any diabetes medication.  Not limiting carbohydrate intake to the extent that he was previously, also not working out anymore.  Has purcahsed equipment to exercise at home, plans to start\par Last optho visit September 2020, no known retinopathy. , has neuropathy. \par \par \par Thyroid nodules: He has had a benign FNA in the past.    He has not noted any change in his thyroid, reports no dysphagia or dyspnea.  \par \par Hypertension: blood pressure has been controlled, no recent change in regimen - has not taken his medication yet today\par \par Hyperlipidemia: taking atorvastatin, no muscle pain\par \par had recent blood tests with his liver team - visible via pt portal on his smartphone\par \par

## 2020-11-23 NOTE — DATA REVIEWED
[FreeTextEntry1] : 10/2020\par cr 2.47\par egfr 26\par hb 8.5\par hct 26.6\par microalb/cr 164\par \par 8/4/2020\par Cr 2.41\par \par Thyroid ultrasound 2/2020\par multiple b/l subcm nodules, one new, one slightly increased in size. LEft  2.7 cm nodule stable

## 2020-11-23 NOTE — ASSESSMENT
[FreeTextEntry1] : 65 year old man post liver transplant with pre-existing T2 diabetes, recently uncontrolled based on home glucose testing\par  -check fructosamine (hba1c not accurate in setting of anemia).  Will resume januvia - (egfr below recommended for jardiance, pt declines GLP-1 at this time - may consider in the future)\par -  encouraged pt to resume exercise\par - retinopathy screening up to date \par  - had flu shot\par  - Does not want to meet with RD.\par \par \par HTN, with ckd, Systolic Blood pressure mildly above goal. followed by nephrology.  Did not yet take medication today.  Continue to monitor on current regimen\par \par Hyperlipidemia\par   - continue atorvastatin\par  - check lipids\par \par  Thyroid nodules\par  - clinically and biochemically euthyroid, \par -  benign fna in the past.   \par   - new nodule on last ultrasound, repeat ultrasound to monitor for growth one year from prior (ie ~ 2/2021)\par \par \par F/u 3 months

## 2020-11-23 NOTE — PHYSICAL EXAM
[Alert] : alert [Healthy Appearance] : healthy appearance [No Acute Distress] : no acute distress [Thyroid Not Enlarged] : the thyroid was not enlarged [No Respiratory Distress] : no respiratory distress [Clear to Auscultation] : lungs were clear to auscultation bilaterally [Normal S1, S2] : normal S1 and S2 [Regular Rhythm] : with a regular rhythm [No Edema] : no peripheral edema [Normal Bowel Sounds] : normal bowel sounds [Not Tender] : non-tender [Soft] : abdomen soft [No Spinal Tenderness] : no spinal tenderness [No Involuntary Movements] : no involuntary movements were seen [Normal Strength/Tone] : muscle strength and tone were normal [Normal Reflexes] : deep tendon reflexes were 2+ and symmetric [No Tremors] : no tremors [Normal Affect] : the affect was normal [Normal Mood] : the mood was normal

## 2020-11-25 LAB
CHOLEST SERPL-MCNC: 147 MG/DL
FRUCTOSAMINE SERPL-MCNC: 351 UMOL/L
HDLC SERPL-MCNC: 51 MG/DL
LDLC SERPL CALC-MCNC: 83 MG/DL
NONHDLC SERPL-MCNC: 96 MG/DL
TRIGL SERPL-MCNC: 64 MG/DL

## 2020-12-11 ENCOUNTER — APPOINTMENT (OUTPATIENT)
Dept: DISASTER EMERGENCY | Facility: CLINIC | Age: 65
End: 2020-12-11

## 2020-12-11 DIAGNOSIS — Z01.818 ENCOUNTER FOR OTHER PREPROCEDURAL EXAMINATION: ICD-10-CM

## 2020-12-13 LAB — SARS-COV-2 N GENE NPH QL NAA+PROBE: NOT DETECTED

## 2021-01-20 ENCOUNTER — RX RENEWAL (OUTPATIENT)
Age: 66
End: 2021-01-20

## 2021-01-27 RX ORDER — PEN NEEDLE, DIABETIC 32GX 5/32"
32G X 4 MM NEEDLE, DISPOSABLE MISCELLANEOUS
Qty: 1 | Refills: 3 | Status: ACTIVE | COMMUNITY
Start: 2021-01-27 | End: 1900-01-01

## 2021-03-08 ENCOUNTER — APPOINTMENT (OUTPATIENT)
Dept: ENDOCRINOLOGY | Facility: CLINIC | Age: 66
End: 2021-03-08
Payer: MEDICARE

## 2021-03-08 VITALS
SYSTOLIC BLOOD PRESSURE: 130 MMHG | WEIGHT: 171 LBS | HEIGHT: 68 IN | TEMPERATURE: 97.9 F | BODY MASS INDEX: 25.91 KG/M2 | OXYGEN SATURATION: 96 % | HEART RATE: 77 BPM | DIASTOLIC BLOOD PRESSURE: 60 MMHG

## 2021-03-08 LAB — HBA1C MFR BLD HPLC: 6.3

## 2021-03-08 PROCEDURE — 99214 OFFICE O/P EST MOD 30 MIN: CPT | Mod: 25

## 2021-03-08 PROCEDURE — 83036 HEMOGLOBIN GLYCOSYLATED A1C: CPT | Mod: QW

## 2021-03-08 NOTE — ASSESSMENT
[FreeTextEntry1] : 65 year old man post liver transplant with pre-existing T2 diabetes, with discrepancy between hba1c and home glucose testing\par  - hba1c likely inaccurate due to anemia\par  -increase ozempic 1.0\par -  encouraged pt to resume exercise\par  - send glucose log in 2 weeks, consider adding additional agent\par - retinopathy screening up to date \par  - had flu shot and covid vaccine\par \par \par \par HTN, with ckd,  Blood pressure at goal Continue to monitor on current regimen.  Followed by nephrology\par \par Hyperlipidemia\par   - continue atorvastatin, check fasting lipids next visit\par \par \par  Thyroid nodules\par  - clinically and biochemically euthyroid, \par -  benign fna in the past.   \par   - new nodule on last ultrasound, repeat ultrasound to monitor for growth \par \par \par F/u 3 months

## 2021-03-08 NOTE — PHYSICAL EXAM
[Alert] : alert [Healthy Appearance] : healthy appearance [No Acute Distress] : no acute distress [Normal Sclera/Conjunctiva] : normal sclera/conjunctiva [No Proptosis] : no proptosis [No LAD] : no lymphadenopathy [Thyroid Not Enlarged] : the thyroid was not enlarged [No Respiratory Distress] : no respiratory distress [Clear to Auscultation] : lungs were clear to auscultation bilaterally [Normal S1, S2] : normal S1 and S2 [Regular Rhythm] : with a regular rhythm [No Edema] : no peripheral edema [Normal Bowel Sounds] : normal bowel sounds [Soft] : abdomen soft [No Spinal Tenderness] : no spinal tenderness [No Involuntary Movements] : no involuntary movements were seen [Normal Strength/Tone] : muscle strength and tone were normal [Normal Reflexes] : deep tendon reflexes were 2+ and symmetric [No Tremors] : no tremors [Normal Affect] : the affect was normal [Normal Mood] : the mood was normal

## 2021-03-08 NOTE — HISTORY OF PRESENT ILLNESS
[FreeTextEntry1] : cc: Diabetes\par \par 65 year old man with T2DM, well controlled, post liver transplant. Taking ozempic, increase to 0.5 last week.  Finds that glucose has been higher than when he was on januvia.  Has been checking glucose every day at different times and values have been 100 - 200s mg/dl.  No hypoglycemia.  Has decreased carbohydrate intake. No exercise. Plans to start again\par Last optho visit February 2021, no known retinopathy. , has neuropathy. \par Had covid vaccine  and flu shot\par \par Thyroid nodules: He has had a benign FNA in the past.    He has not noted any change in his thyroid, reports no dysphagia or dyspnea.  \par \par Hypertension: blood pressure has been controlled, no recent change \par \par Hyperlipidemia: taking atorvastatin, no muscle pain\par \par \par \par

## 2021-06-14 ENCOUNTER — APPOINTMENT (OUTPATIENT)
Dept: ENDOCRINOLOGY | Facility: CLINIC | Age: 66
End: 2021-06-14
Payer: MEDICARE

## 2021-06-14 VITALS
DIASTOLIC BLOOD PRESSURE: 64 MMHG | BODY MASS INDEX: 26.76 KG/M2 | SYSTOLIC BLOOD PRESSURE: 148 MMHG | OXYGEN SATURATION: 98 % | WEIGHT: 176 LBS | HEART RATE: 69 BPM

## 2021-06-14 VITALS — SYSTOLIC BLOOD PRESSURE: 126 MMHG | DIASTOLIC BLOOD PRESSURE: 60 MMHG

## 2021-06-14 LAB
GLUCOSE BLDC GLUCOMTR-MCNC: 112
HBA1C MFR BLD HPLC: 5.9

## 2021-06-14 PROCEDURE — 99214 OFFICE O/P EST MOD 30 MIN: CPT | Mod: 25

## 2021-06-14 PROCEDURE — 82962 GLUCOSE BLOOD TEST: CPT

## 2021-06-14 PROCEDURE — 83036 HEMOGLOBIN GLYCOSYLATED A1C: CPT | Mod: QW

## 2021-06-14 NOTE — HISTORY OF PRESENT ILLNESS
[FreeTextEntry1] : cc: Diabetes\par \par 65 year old man with T2DM, well controlled, post liver transplant. Taking ozempic, 01.0 mg.  F.  Has been checking glucose  twcie daily and values have been 120 - 160 mg/dl.  No hypoglycemia.  Limiting carbohydrate intake. Has resumed exercise, going to the gym daily. \par Last optho visit February 2021, no known retinopathy. , has neuropathy. \par Had covid vaccine , [employer]\par \par Thyroid nodules: He has had a benign FNA in the past.    He has not noted any change in his thyroid, reports no dysphagia or dyspnea.  Had recent ultrasound\par \par Hypertension: blood pressure has been controlled, no recent change in regimen\par \par Hyperlipidemia: taking atorvastatin\par \par \par \par  Sara Olvera

## 2021-06-14 NOTE — ASSESSMENT
[FreeTextEntry1] : 65 year old man post liver transplant with pre-existing T2 diabetes, \par  -continue ozempic 1.0\par -  encouraged pt to continue with diet and exercise\par - retinopathy screening up to date \par  - had  covid vaccine\par  - had recent blood tests, will send results\par \par \par \par HTN, with ckd,  Blood pressure at goal \par  - Continue to monitor on current regimen.  Followed by nephrology\par \par Hyperlipidemia\par   - continue atorvastatin, check fasting lipids \par \par \par  Thyroid nodules\par  - clinically and biochemically euthyroid, \par -  benign fna in the past.   \par   - slight increase in size of left lower pole nodule repeat ultrasound 3-6 months, consider fna\par \par \par F/u 3 months

## 2021-06-14 NOTE — DATA REVIEWED
[FreeTextEntry1] : 3/2021 thyroid ultrasound\par mulitple b/l nodules, unchanged, slight growth of left lower pole nodules now 1.0 x 1.4 x 0.9 cm\par \par \par 10/2020\par cr 2.47\par egfr 26\par hb 8.5\par hct 26.6\par microalb/cr 164\par \par 8/4/2020\par Cr 2.41\par \par Thyroid ultrasound 2/2020\par multiple b/l subcm nodules, one new, one slightly increased in size. LEft  2.7 cm nodule stable

## 2021-06-14 NOTE — PHYSICAL EXAM
[Alert] : alert [Healthy Appearance] : healthy appearance [No Acute Distress] : no acute distress [Normal Sclera/Conjunctiva] : normal sclera/conjunctiva [No Proptosis] : no proptosis [No LAD] : no lymphadenopathy [No Respiratory Distress] : no respiratory distress [Clear to Auscultation] : lungs were clear to auscultation bilaterally [Normal S1, S2] : normal S1 and S2 [Regular Rhythm] : with a regular rhythm [No Edema] : no peripheral edema [Normal Bowel Sounds] : normal bowel sounds [Not Tender] : non-tender [Soft] : abdomen soft [No Spinal Tenderness] : no spinal tenderness [No Involuntary Movements] : no involuntary movements were seen [Normal Strength/Tone] : muscle strength and tone were normal [Normal Reflexes] : deep tendon reflexes were 2+ and symmetric [No Tremors] : no tremors [Normal Affect] : the affect was normal [Normal Mood] : the mood was normal [de-identified] : nodular thyroid

## 2021-08-05 ENCOUNTER — NON-APPOINTMENT (OUTPATIENT)
Age: 66
End: 2021-08-05

## 2021-10-11 ENCOUNTER — APPOINTMENT (OUTPATIENT)
Dept: ENDOCRINOLOGY | Facility: CLINIC | Age: 66
End: 2021-10-11
Payer: MEDICARE

## 2021-10-11 VITALS
SYSTOLIC BLOOD PRESSURE: 132 MMHG | OXYGEN SATURATION: 98 % | TEMPERATURE: 97.9 F | BODY MASS INDEX: 24.86 KG/M2 | DIASTOLIC BLOOD PRESSURE: 62 MMHG | HEART RATE: 66 BPM | HEIGHT: 68 IN | WEIGHT: 164 LBS

## 2021-10-11 DIAGNOSIS — Z23 ENCOUNTER FOR IMMUNIZATION: ICD-10-CM

## 2021-10-11 LAB — HBA1C MFR BLD HPLC: 5

## 2021-10-11 PROCEDURE — G0008: CPT

## 2021-10-11 PROCEDURE — 90662 IIV NO PRSV INCREASED AG IM: CPT

## 2021-10-11 PROCEDURE — 83036 HEMOGLOBIN GLYCOSYLATED A1C: CPT | Mod: QW

## 2021-10-11 PROCEDURE — 99214 OFFICE O/P EST MOD 30 MIN: CPT | Mod: 25

## 2021-10-11 RX ORDER — SITAGLIPTIN 25 MG/1
25 TABLET, FILM COATED ORAL DAILY
Qty: 90 | Refills: 3 | Status: DISCONTINUED | COMMUNITY
Start: 2017-03-27 | End: 2021-10-11

## 2021-10-11 RX ORDER — MYCOPHENOLIC ACID 360 MG/1
360 TABLET, DELAYED RELEASE ORAL
Refills: 0 | Status: ACTIVE | COMMUNITY
Start: 2019-08-16

## 2021-10-11 NOTE — PHYSICAL EXAM
[Alert] : alert [Healthy Appearance] : healthy appearance [No Acute Distress] : no acute distress [Normal Sclera/Conjunctiva] : normal sclera/conjunctiva [No Proptosis] : no proptosis [No Neck Mass] : no neck mass was observed [No LAD] : no lymphadenopathy [No Respiratory Distress] : no respiratory distress [Clear to Auscultation] : lungs were clear to auscultation bilaterally [Normal S1, S2] : normal S1 and S2 [Regular Rhythm] : with a regular rhythm [No Edema] : no peripheral edema [Pedal Pulses Normal] : the pedal pulses are present [Normal Bowel Sounds] : normal bowel sounds [Not Tender] : non-tender [Soft] : abdomen soft [No Involuntary Movements] : no involuntary movements were seen [Normal Strength/Tone] : muscle strength and tone were normal [Right foot was examined, including] : right foot ~C was examined, including visual inspection with sensory and pulse exams [Left foot was examined, including] : left foot ~C was examined, including visual inspection with sensory and pulse exams [Normal Reflexes] : deep tendon reflexes were 2+ and symmetric [No Tremors] : no tremors [Normal Affect] : the affect was normal [Normal Mood] : the mood was normal [Kyphosis] : no kyphosis present [Foot Ulcers] : no foot ulcers [de-identified] : Decreased sensation on monofilament testing of LE's b/l [de-identified] : + callus, + onychomycosis

## 2021-10-11 NOTE — ASSESSMENT
[FreeTextEntry1] : 65 year old man post liver transplant with pre-existing T2 diabetes, \par  -continue ozempic 1.0\par -  encouraged pt to continue with diet and increase exercise\par  - Monitor weight, if weight loss continues recommend evaluation for other etiologies\par - retinopathy screening up to date \par  - had  covid vaccine\par - flu shot administered today\par - has blood tests scheduled next week, will send results\par \par \par \par HTN, with ckd,  Blood pressure at goal \par  - Continue to monitor on current regimen.  Followed by nephrology\par \par Hyperlipidemia\par   - continue atorvastatin\par \par  Thyroid nodules\par  - clinically  euthyroid, check tfts\par -  benign fna in the past.   \par   - slight increase in size of left lower pole nodule repeat ultrasound to monitor for growth, consider fna\par \par \par F/u 3 months

## 2021-10-11 NOTE — DATA REVIEWED
[FreeTextEntry1] : 8/2021\par LDL 63\par \par \par 3/2021 thyroid ultrasound\par mulitple b/l nodules, unchanged, slight growth of left lower pole nodules now 1.0 x 1.4 x 0.9 cm\par \par \par 10/2020\par cr 2.47\par egfr 26\par hb 8.5\par hct 26.6\par microalb/cr 164\par \par 8/4/2020\par Cr 2.41\par \par Thyroid ultrasound 2/2020\par multiple b/l subcm nodules, one new, one slightly increased in size. LEft  2.7 cm nodule stable

## 2021-12-17 ENCOUNTER — NON-APPOINTMENT (OUTPATIENT)
Age: 66
End: 2021-12-17

## 2022-01-03 ENCOUNTER — APPOINTMENT (OUTPATIENT)
Dept: ENDOCRINOLOGY | Facility: CLINIC | Age: 67
End: 2022-01-03
Payer: MEDICARE

## 2022-01-03 VITALS
HEART RATE: 75 BPM | BODY MASS INDEX: 24.71 KG/M2 | SYSTOLIC BLOOD PRESSURE: 130 MMHG | DIASTOLIC BLOOD PRESSURE: 78 MMHG | TEMPERATURE: 98.6 F | WEIGHT: 163 LBS | OXYGEN SATURATION: 97 % | HEIGHT: 68 IN

## 2022-01-03 LAB — HBA1C MFR BLD HPLC: 6.3

## 2022-01-03 PROCEDURE — 83036 HEMOGLOBIN GLYCOSYLATED A1C: CPT | Mod: QW

## 2022-01-03 PROCEDURE — 99214 OFFICE O/P EST MOD 30 MIN: CPT | Mod: 25

## 2022-01-03 NOTE — DATA REVIEWED
[FreeTextEntry1] : 8/2021\par LDL 63\par \par \par 3/2021 thyroid ultrasound\par mulitple b/l nodules, unchanged, slight growth of left lower pole nodules now 1.0 x 1.4 x 0.9 cm\par \par \par 10/2020\par cr 2.47\par egfr 26\par hb 8.5\par hct 26.6\par microalb/cr 164\par \par 8/4/2020\par Cr 2.41\par \par Thyroid ultrasound 2/2020\par multiple b/l subcm nodules, one new, one slightly increased in size. LEft  2.7 cm nodule stable
SURGERY

## 2022-01-03 NOTE — HISTORY OF PRESENT ILLNESS
[FreeTextEntry1] : cc: Diabetes\par \par 66 year old man with T2DM, well controlled, post liver transplant. Taking ozempic, 1.0 mg weekly and repaglinide 2  mg before meals.    Had epidural injection in Nov and glucose values increased, have since improved.  Now Checking glucose twice daily and values have been 80 - 150s mg/dl in the morning, up to 210 later in the day. \par    Limiting carbohydrate intake. No formal exercise. Has started walking a little (had pericardial window in october, tube to be removed this week)\par Last optho visit October 2021, no known retinopathy. , has neuropathy. \par Had covid vaccine , pfizer and booster - no antibodies, may be getting a new vaccine\par had  flu shot\par Had blood tests earlier today\par \par Thyroid nodules: He has had a benign FNA in the past.    He reports no recent  change in his thyroid, no dysphagia or dyspnea. \par \par Hypertension: blood pressure has been controlled, no recent change in medication\par \par Hyperlipidemia: taking atorvastatin\par \par \par

## 2022-01-03 NOTE — ASSESSMENT
[FreeTextEntry1] : 66 year old man post liver transplant with pre-existing T2 diabetes, \par  -continue ozempic 1.0 weekly and  repaglinide 2 mg before meals\par  - send glucose log, consider increasing dose of repaglinide\par -  encouraged pt to continue with diet and increase exercise\par - retinopathy screening up to date \par  - had  covid vaccine, booster\par - had flu shot \par \par \par HTN, with ckd,  Blood pressure at goal \par  - Continue to monitor on current regimen.  Followed by nephrology\par - consider sglt2, f/u bmp\par \par Hyperlipidemia\par   - continue atorvastatin\par \par  Thyroid nodules\par  - clinically and biochemically  euthyroid, \par -  benign fna in the past.   \par   - nodules stable/smaller on recent ultrasound, repeat in one year\par \par \par F/u 3 months

## 2022-01-03 NOTE — PHYSICAL EXAM
[Alert] : alert [Healthy Appearance] : healthy appearance [No Acute Distress] : no acute distress [Normal Sclera/Conjunctiva] : normal sclera/conjunctiva [No Proptosis] : no proptosis [No LAD] : no lymphadenopathy [Thyroid Not Enlarged] : the thyroid was not enlarged [No Respiratory Distress] : no respiratory distress [Clear to Auscultation] : lungs were clear to auscultation bilaterally [Normal S1, S2] : normal S1 and S2 [Regular Rhythm] : with a regular rhythm [No Edema] : no peripheral edema [Normal Bowel Sounds] : normal bowel sounds [Not Tender] : non-tender [Soft] : abdomen soft [No Spinal Tenderness] : no spinal tenderness [No Involuntary Movements] : no involuntary movements were seen [Normal Strength/Tone] : muscle strength and tone were normal [Normal Reflexes] : deep tendon reflexes were 2+ and symmetric [No Tremors] : no tremors [Normal Affect] : the affect was normal [Normal Mood] : the mood was normal [de-identified] : +

## 2022-01-13 ENCOUNTER — RX RENEWAL (OUTPATIENT)
Age: 67
End: 2022-01-13

## 2022-04-11 ENCOUNTER — RX CHANGE (OUTPATIENT)
Age: 67
End: 2022-04-11

## 2022-05-16 ENCOUNTER — APPOINTMENT (OUTPATIENT)
Dept: ENDOCRINOLOGY | Facility: CLINIC | Age: 67
End: 2022-05-16
Payer: MEDICARE

## 2022-05-16 VITALS
HEIGHT: 68 IN | TEMPERATURE: 97.7 F | DIASTOLIC BLOOD PRESSURE: 70 MMHG | SYSTOLIC BLOOD PRESSURE: 120 MMHG | HEART RATE: 78 BPM | WEIGHT: 173 LBS | BODY MASS INDEX: 26.22 KG/M2 | OXYGEN SATURATION: 97 %

## 2022-05-16 DIAGNOSIS — E11.649 TYPE 2 DIABETES MELLITUS WITH HYPOGLYCEMIA W/OUT COMA: ICD-10-CM

## 2022-05-16 LAB
GLUCOSE BLDC GLUCOMTR-MCNC: 101
GLUCOSE BLDC GLUCOMTR-MCNC: 52
GLUCOSE BLDC GLUCOMTR-MCNC: 64
GLUCOSE BLDC GLUCOMTR-MCNC: 76
HBA1C MFR BLD HPLC: 6.8

## 2022-05-16 PROCEDURE — 82962 GLUCOSE BLOOD TEST: CPT

## 2022-05-16 PROCEDURE — 99214 OFFICE O/P EST MOD 30 MIN: CPT | Mod: 25

## 2022-05-16 PROCEDURE — 83036 HEMOGLOBIN GLYCOSYLATED A1C: CPT | Mod: QW

## 2022-05-16 PROCEDURE — 95250 CONT GLUC MNTR PHYS/QHP EQP: CPT

## 2022-05-16 PROCEDURE — 95251 CONT GLUC MNTR ANALYSIS I&R: CPT

## 2022-05-16 NOTE — DATA REVIEWED
[FreeTextEntry1] : LDL\par cr 2.9\par Hemoglobin 10.5\par A1c 7.3\par LDL 61\par \par 8/2021\par LDL 63\par \par \par 3/2021 thyroid ultrasound\par mulitple b/l nodules, unchanged, slight growth of left lower pole nodules now 1.0 x 1.4 x 0.9 cm\par \par \par 10/2020\par cr 2.47\par egfr 26\par hb 8.5\par hct 26.6\par microalb/cr 164\par \par 8/4/2020\par Cr 2.41\par \par Thyroid ultrasound 2/2020\par multiple b/l subcm nodules, one new, one slightly increased in size. LEft  2.7 cm nodule stable

## 2022-05-16 NOTE — PHYSICAL EXAM
[Alert] : alert [Healthy Appearance] : healthy appearance [No Acute Distress] : no acute distress [Normal Sclera/Conjunctiva] : normal sclera/conjunctiva [PERRL] : pupils equal, round and reactive to light [No LAD] : no lymphadenopathy [Thyroid Not Enlarged] : the thyroid was not enlarged [No Respiratory Distress] : no respiratory distress [Clear to Auscultation] : lungs were clear to auscultation bilaterally [Normal S1, S2] : normal S1 and S2 [Regular Rhythm] : with a regular rhythm [No Edema] : no peripheral edema [Normal Bowel Sounds] : normal bowel sounds [Soft] : abdomen soft [No Spinal Tenderness] : no spinal tenderness [No Involuntary Movements] : no involuntary movements were seen [Normal Strength/Tone] : muscle strength and tone were normal [Normal Reflexes] : deep tendon reflexes were 2+ and symmetric [No Tremors] : no tremors [Normal Affect] : the affect was normal [Normal Mood] : the mood was normal

## 2022-05-16 NOTE — ASSESSMENT
[FreeTextEntry1] : 66 year old man post liver transplant with pre-existing T2 diabetes, with reasonable control, but with frequent hypgoglycemia\par \par - stop repaglinide before dinner\par  -continue ozempic 1.0 weekly and  repaglinide 2 mg before breakfast and lunch\par - check cgm to determine patterns of hyper/hypoglycemia, consider stop repaglinide and add sglt2\par  - send glucose log, consider increasing dose of repaglinide\par -  encouraged pt to continue with diet and increase exercise\par - retinopathy screening up to date \par  - had  covid vaccine, booster\par - had flu shot \par \par \par HTN, with ckd,  Blood pressure at goal \par  - Continue to monitor on current regimen.  Followed by nephrology\par - consider sglt2 as above\par \par Hyperlipidemia\par   - continue atorvastatin\par \par  Thyroid nodules\par  - clinically and biochemically  euthyroid, \par -  benign fna in the past.   \par   - nodules stable/smaller on recent ultrasound, repeat in ~ 6 months\par \par \par F/u 3 months

## 2022-05-16 NOTE — HISTORY OF PRESENT ILLNESS
[FreeTextEntry1] : cc: Diabetes\par \par 66 year old man with T2DM, well controlled, post liver transplant. Taking ozempic, 1.0 mg weekly and repaglinide 2  mg before meals.  Checking glucose twice daily and values have been 50 - 150s mg/dl, often has hypoglycemia in the morning, he does not have symptoms.  Drinks juice and values increase\par  DIet:  Limiting carbohydrate intake. \par Exercise:  walking a little, has  had LE weakness (has appt with orthopedist)\par Last optho visit May 2022, no known retinopathy. , has neuropathy. \par Had covid vaccine , pfizer and booster - no antibodies, may be getting a new vaccine\par had  flu shot\par \par \par Thyroid nodules: He has had a benign FNA in the past.    He has not noted any recent  change in his thyroid, reports no dysphagia or dyspnea. \par \par Hypertension: blood pressure has been controlled, no recent change in regimen\par \par Hyperlipidemia: takes atorvastatin\par \par \par

## 2022-06-08 ENCOUNTER — NON-APPOINTMENT (OUTPATIENT)
Age: 67
End: 2022-06-08

## 2022-06-15 RX ORDER — REPAGLINIDE 2 MG/1
2 TABLET ORAL
Qty: 180 | Refills: 3 | Status: ACTIVE | COMMUNITY
Start: 2021-12-14 | End: 1900-01-01

## 2022-06-24 ENCOUNTER — NON-APPOINTMENT (OUTPATIENT)
Age: 67
End: 2022-06-24

## 2022-08-08 ENCOUNTER — APPOINTMENT (OUTPATIENT)
Dept: ENDOCRINOLOGY | Facility: CLINIC | Age: 67
End: 2022-08-08

## 2022-08-08 VITALS
TEMPERATURE: 97.9 F | WEIGHT: 165 LBS | DIASTOLIC BLOOD PRESSURE: 60 MMHG | BODY MASS INDEX: 25.01 KG/M2 | HEIGHT: 68 IN | SYSTOLIC BLOOD PRESSURE: 130 MMHG | OXYGEN SATURATION: 96 % | HEART RATE: 67 BPM

## 2022-08-08 LAB
GLUCOSE BLDC GLUCOMTR-MCNC: 174
HBA1C MFR BLD HPLC: 6.7

## 2022-08-08 PROCEDURE — 83036 HEMOGLOBIN GLYCOSYLATED A1C: CPT | Mod: QW

## 2022-08-08 PROCEDURE — 82962 GLUCOSE BLOOD TEST: CPT

## 2022-08-08 PROCEDURE — 99214 OFFICE O/P EST MOD 30 MIN: CPT | Mod: 25

## 2022-08-08 NOTE — PHYSICAL EXAM
[Alert] : alert [Healthy Appearance] : healthy appearance [No Acute Distress] : no acute distress [Normal Sclera/Conjunctiva] : normal sclera/conjunctiva [No Proptosis] : no proptosis [No LAD] : no lymphadenopathy [Thyroid Not Enlarged] : the thyroid was not enlarged [No Respiratory Distress] : no respiratory distress [Clear to Auscultation] : lungs were clear to auscultation bilaterally [Normal S1, S2] : normal S1 and S2 [Regular Rhythm] : with a regular rhythm [Normal Bowel Sounds] : normal bowel sounds [Not Tender] : non-tender [Soft] : abdomen soft [No Spinal Tenderness] : no spinal tenderness [No Involuntary Movements] : no involuntary movements were seen [Normal Strength/Tone] : muscle strength and tone were normal [Normal Reflexes] : deep tendon reflexes were 2+ and symmetric [Normal Affect] : the affect was normal [Normal Mood] : the mood was normal [de-identified] : + resting tremor

## 2022-08-08 NOTE — HISTORY OF PRESENT ILLNESS
[FreeTextEntry1] : cc: Diabetes\par \par 66 year old man with T2DM, well controlled, post liver transplant. Taking ozempic, 1.0 mg weekly, but paused due to cost, just resumed last week.   Stopped repaglinide due to hypoglycemia.  Checking glucose twice daily and values have been 157 - 481 mg/dl, recently values were high (before resuming ozempic) so resumed repaglinide\par  DIet:  Limiting carbohydrate intake. \par Exercise: Minimal exercise, has  had LE weakness (has appt with orthopedist)\par Last optho visit May 2022, no known retinopathy. , has neuropathy. \par Had covid vaccine , pfizer and booster - no antibodies, had Evushield x 2\par \par \par Thyroid nodules: He has had a benign FNA in the past.    He reports no change in his thyroid, no dysphagia or dyspnea. \par \par Hypertension: blood pressure has been controlled, no recent change in medication\par \par Hyperlipidemia: takes atorvastatin\par \par \par

## 2022-08-08 NOTE — DATA REVIEWED
[FreeTextEntry1] : 5/2022\par AST/ALT 15/17\par Cr 2.89\par EGFR 21\par Ca 8.8\par \par \par 8/2021 - LDL 63\par \par \par 3/2021 thyroid ultrasound\par mulitple b/l nodules, unchanged, slight growth of left lower pole nodules now 1.0 x 1.4 x 0.9 cm\par \par \par 10/2020\par cr 2.47\par egfr 26\par hb 8.5\par hct 26.6\par microalb/cr 164\par \par 8/4/2020\par Cr 2.41\par \par Thyroid ultrasound 2/2020\par multiple b/l subcm nodules, one new, one slightly increased in size. LEft  2.7 cm nodule stable

## 2022-08-08 NOTE — ASSESSMENT
[FreeTextEntry1] : 66 year old man post liver transplant with pre-existing T2 diabetes, previously with reasonable control, no recent hypoglycemia, but now with frequent hyperglycemia ( had 2 recent steroid injections)\par  -continue ozempic 1.0 weekly (started 3 days ago)\par  - continue repaglinide for now, but anticipate decreased need now that back on ozempic.  If values decrease into 80s, will decrease repaglinide, and consider stopping again\par -  encouraged pt to continue with diet and increase exercise as able (plans to going to gym again)\par - retinopathy screening up to date \par  - had  covid vaccine, booster\par  - Will be moving to [place], may transition care to [place], for now will schedule f/u.\par \par \par HTN, with ckd,  Blood pressure at goal \par  - Continue to monitor on current regimen.  Followed by nephrology\par - egfr 21, hold off on sglt2 \par \par Hyperlipidemia\par   - continue atorvastatin, check lipids\par \par  Thyroid nodules\par  - clinically and biochemically  euthyroid, \par -  benign fna in the past.   \par   - nodules stable/smaller on recent ultrasound, repeat in ~ 3 months\par \par \par F/u 3 months

## 2022-09-09 ENCOUNTER — NON-APPOINTMENT (OUTPATIENT)
Age: 67
End: 2022-09-09

## 2022-10-11 ENCOUNTER — RX RENEWAL (OUTPATIENT)
Age: 67
End: 2022-10-11

## 2023-01-09 ENCOUNTER — APPOINTMENT (OUTPATIENT)
Dept: ENDOCRINOLOGY | Facility: CLINIC | Age: 68
End: 2023-01-09

## 2023-03-23 RX ORDER — SEMAGLUTIDE 0.68 MG/ML
2 INJECTION, SOLUTION SUBCUTANEOUS
Qty: 3 | Refills: 3 | Status: ACTIVE | COMMUNITY
Start: 2021-01-27 | End: 1900-01-01

## 2023-07-31 ENCOUNTER — APPOINTMENT (OUTPATIENT)
Dept: ENDOCRINOLOGY | Facility: CLINIC | Age: 68
End: 2023-07-31
Payer: MEDICARE

## 2023-07-31 VITALS
OXYGEN SATURATION: 98 % | WEIGHT: 160 LBS | BODY MASS INDEX: 24.25 KG/M2 | SYSTOLIC BLOOD PRESSURE: 122 MMHG | DIASTOLIC BLOOD PRESSURE: 60 MMHG | HEART RATE: 50 BPM | HEIGHT: 68 IN

## 2023-07-31 LAB — HBA1C MFR BLD HPLC: 5.4

## 2023-07-31 PROCEDURE — 83036 HEMOGLOBIN GLYCOSYLATED A1C: CPT | Mod: QW

## 2023-07-31 PROCEDURE — 99214 OFFICE O/P EST MOD 30 MIN: CPT | Mod: 25

## 2023-07-31 RX ORDER — ATORVASTATIN CALCIUM 40 MG/1
40 TABLET, FILM COATED ORAL
Qty: 30 | Refills: 11 | Status: ACTIVE | COMMUNITY
Start: 2019-08-13 | End: 1900-01-01

## 2023-07-31 NOTE — END OF VISIT
[] : Resident [FreeTextEntry3] : Patient with type 2 diabetes, well controlled.  Stopped Ozempic due to excessive weight loss.  Blood glucose has remained controlled.  We will continue to monitor.  Blood pressure at goal, continue current regimen and follow-up with nephrology.  Check CMP and U ACR.  Continue statin.  Thyroid nodules have been stable on ultrasound, repeat in approximately 6 months

## 2023-07-31 NOTE — PHYSICAL EXAM
[Alert] : alert [Well Nourished] : well nourished [No Acute Distress] : no acute distress [Normal Sclera/Conjunctiva] : normal sclera/conjunctiva [EOMI] : extra ocular movement intact [PERRL] : pupils equal, round and reactive to light [No Respiratory Distress] : no respiratory distress [Normal Rate and Effort] : normal respiratory rate and effort [Clear to Auscultation] : lungs were clear to auscultation bilaterally [Normal S1, S2] : normal S1 and S2 [Normal Rate] : heart rate was normal [Regular Rhythm] : with a regular rhythm [No Edema] : no peripheral edema [Normal Bowel Sounds] : normal bowel sounds [Not Tender] : non-tender [Not Distended] : not distended [Soft] : abdomen soft

## 2023-07-31 NOTE — ASSESSMENT
[FreeTextEntry1] : 67 year man with type 2 diabetes, well controlled post liver transplant - A1c 5.4 today, though likely inaccurate due to anemia and Procrit - Patient had continued weight loss on decreased dose of Ozempic, 0.5 mg daily and has stopped therapy.  Glucose values have remained in range.  Continue to monitor without diabetes medications for now.  Reviewed cardiovascular benefit of Ozempic.  If glucose values increase consider restarting Ozempic at 0.25 mg weekly or alternatively DPP 4 - check CMP and uacr -Retinopathy screening up to date  #HTN with CKD - BP at goal.  Patient is not on ACE or ARB.  Last EGFR below limit for SGLT2.  Continue follow-up with nephrology - Continue current regimen - check CMP as above  #HLD -Continue atorvastatin  #thyroid nodules - reports normal TFTs recently -will send report - benign FNA in past -Repeat thyroid ultrasound in approximately 6 months   Case d/w Dr. Patel

## 2023-07-31 NOTE — HISTORY OF PRESENT ILLNESS
[FreeTextEntry1] : 67 year man with  well controlled DM2, HTN, multiple thyroid nodules, CKD, history of  liver transplant presenting for f/u   Stopped ozempic 5 weeks ago because of losing too much weight. Not currently on any anti-DM meds.  Checking glucose twice daily and values have been  68 - 150 mg/dl,   DIet: Limiting carbohydrate intake.  Exercise: Minimal exercise,  Had recent optho visit, reports no retinopathy. , has neuropathy.   Thyroid nodules: He has had a benign FNA in the past. He has not noted any change in his thyroid, reports no dysphagia or dyspnea.   Hypertension: blood pressure has been controlled, no recent change in medication  Hyperlipidemia: takes atorvastatin

## 2024-02-05 ENCOUNTER — APPOINTMENT (OUTPATIENT)
Dept: ENDOCRINOLOGY | Facility: CLINIC | Age: 69
End: 2024-02-05
Payer: MEDICARE

## 2024-02-05 VITALS
SYSTOLIC BLOOD PRESSURE: 140 MMHG | HEIGHT: 68 IN | DIASTOLIC BLOOD PRESSURE: 70 MMHG | OXYGEN SATURATION: 98 % | BODY MASS INDEX: 26.37 KG/M2 | HEART RATE: 79 BPM | WEIGHT: 174 LBS

## 2024-02-05 DIAGNOSIS — N18.9 CHRONIC KIDNEY DISEASE, UNSPECIFIED: ICD-10-CM

## 2024-02-05 LAB — HBA1C MFR BLD HPLC: 5.3

## 2024-02-05 PROCEDURE — 99214 OFFICE O/P EST MOD 30 MIN: CPT

## 2024-02-05 PROCEDURE — 95251 CONT GLUC MNTR ANALYSIS I&R: CPT

## 2024-02-05 PROCEDURE — 83036 HEMOGLOBIN GLYCOSYLATED A1C: CPT | Mod: QW

## 2024-02-05 NOTE — HISTORY OF PRESENT ILLNESS
[FreeTextEntry1] : cc: diabetes  68 year old man with T2DM, well controlled, post liver transplant, stopped ozempic due to weight loss, Has been off all medication.  Had pericardial effusion in december requiring drainage NOt checking glucose, had dexcom placed by another doctor.   Values mostly in range, but had some postprandial hyperglycemia and occasional values in high 60s (off meds) DIet: Limiting carbohydrate intake. Exercise: Minimal exercise since recent hospitalization wants to resume Last optho visit about 8 months ago, no known retinopathy. , has neuropathy. Had covid vaccine , pfizer and booster - no antibodies, had [facility]  Thyroid nodules: He has had a benign FNA in the past. He reports no change in his thyroid, no dysphagia or dyspnea.  Hypertension: blood pressure has been controlled, off ace/arb, sees nephrology.  Takes hydralazone, metoprolol,   Hyperlipidemia: takes atorvastatin  Had recent blood tests done

## 2024-02-05 NOTE — ASSESSMENT
[FreeTextEntry1] : 68 year man with type 2 diabetes, well controlled post liver transplant - A1c 5.1 today, though likely inaccurate due to anemia and Procrit - CGM downloaded and reviewed, TIR 95%, <1% hypoglycemia.  Intermittent mild postprandial hyperglycemia.  DIscussed decreasing carbohydrate content of lunch and dinner.  Continue to monitor without medication for now. - Patient had continued weight loss on decreased dose of Ozempic, 0.5 mg daily and has stopped therapy.  Glucose values have remained in range.  Continue to monitor without diabetes medications for now.  Can consider DPP4 or replaglinide - awaiting blood test results -Retinopathy screening up to date  #HTN with CKD - BP slightly above goal. Medication being adjusted by nephrology.  Patient is not on ACE or ARB.  Last EGFR below limit for SGLT2.  Continue follow-up with nephrology - Continue current regimen   #HLD -Continue atorvastatin  #thyroid nodules -f/u TFTs - benign FNA in past -Repeat thyroid ultrasound to monitor for change

## 2024-02-05 NOTE — PHYSICAL EXAM
[Alert] : alert [Healthy Appearance] : healthy appearance [No Acute Distress] : no acute distress [Normal Sclera/Conjunctiva] : normal sclera/conjunctiva [No Proptosis] : no proptosis [No LAD] : no lymphadenopathy [Thyroid Not Enlarged] : the thyroid was not enlarged [No Respiratory Distress] : no respiratory distress [Clear to Auscultation] : lungs were clear to auscultation bilaterally [Normal S1, S2] : normal S1 and S2 [Regular Rhythm] : with a regular rhythm [No Edema] : no peripheral edema [Pedal Pulses Normal] : the pedal pulses are present [Normal Bowel Sounds] : normal bowel sounds [Not Tender] : non-tender [Soft] : abdomen soft [No Spinal Tenderness] : no spinal tenderness [No Involuntary Movements] : no involuntary movements were seen [Normal Strength/Tone] : muscle strength and tone were normal [Right foot was examined, including] : right foot ~C was examined, including visual inspection with sensory and pulse exams [Left foot was examined, including] : left foot ~C was examined, including visual inspection with sensory and pulse exams [Normal Reflexes] : deep tendon reflexes were 2+ and symmetric [No Tremors] : no tremors [Normal Affect] : the affect was normal [Normal Mood] : the mood was normal [Foot Ulcers] : no foot ulcers [de-identified] : + callus b/l [de-identified] : decreased sensation on monofilament testing of LEs b/l

## 2024-02-05 NOTE — QUALITY MEASURES
[Visual inspection, sensory exam] : Foot exam, including visual inspection, sensory exam with mono filament, and pulse exam, was performed within the last 12 months [TextEntry] : Foot exam 2/2024

## 2024-06-24 ENCOUNTER — APPOINTMENT (OUTPATIENT)
Dept: ENDOCRINOLOGY | Facility: CLINIC | Age: 69
End: 2024-06-24
Payer: MEDICARE

## 2024-06-24 VITALS
DIASTOLIC BLOOD PRESSURE: 60 MMHG | BODY MASS INDEX: 26.37 KG/M2 | SYSTOLIC BLOOD PRESSURE: 120 MMHG | OXYGEN SATURATION: 98 % | WEIGHT: 174 LBS | HEART RATE: 64 BPM | HEIGHT: 68 IN

## 2024-06-24 DIAGNOSIS — I10 ESSENTIAL (PRIMARY) HYPERTENSION: ICD-10-CM

## 2024-06-24 DIAGNOSIS — E11.9 TYPE 2 DIABETES MELLITUS W/OUT COMPLICATIONS: ICD-10-CM

## 2024-06-24 DIAGNOSIS — E78.5 HYPERLIPIDEMIA, UNSPECIFIED: ICD-10-CM

## 2024-06-24 DIAGNOSIS — E04.2 NONTOXIC MULTINODULAR GOITER: ICD-10-CM

## 2024-06-24 LAB
GLUCOSE BLDC GLUCOMTR-MCNC: 141
HBA1C MFR BLD HPLC: 5.3

## 2024-06-24 PROCEDURE — 83036 HEMOGLOBIN GLYCOSYLATED A1C: CPT | Mod: QW

## 2024-06-24 PROCEDURE — 99214 OFFICE O/P EST MOD 30 MIN: CPT

## 2024-06-24 PROCEDURE — 82962 GLUCOSE BLOOD TEST: CPT

## 2024-06-24 PROCEDURE — G2211 COMPLEX E/M VISIT ADD ON: CPT

## 2024-07-24 ENCOUNTER — RX RENEWAL (OUTPATIENT)
Age: 69
End: 2024-07-24

## 2024-11-04 ENCOUNTER — APPOINTMENT (OUTPATIENT)
Dept: ENDOCRINOLOGY | Facility: CLINIC | Age: 69
End: 2024-11-04

## 2025-03-04 ENCOUNTER — APPOINTMENT (OUTPATIENT)
Facility: CLINIC | Age: 70
End: 2025-03-04
Payer: MEDICARE

## 2025-03-04 VITALS
OXYGEN SATURATION: 96 % | BODY MASS INDEX: 26.52 KG/M2 | WEIGHT: 175 LBS | DIASTOLIC BLOOD PRESSURE: 57 MMHG | HEIGHT: 68 IN | HEART RATE: 83 BPM | SYSTOLIC BLOOD PRESSURE: 109 MMHG

## 2025-03-04 DIAGNOSIS — E04.2 NONTOXIC MULTINODULAR GOITER: ICD-10-CM

## 2025-03-04 DIAGNOSIS — E11.9 TYPE 2 DIABETES MELLITUS W/OUT COMPLICATIONS: ICD-10-CM

## 2025-03-04 DIAGNOSIS — I10 ESSENTIAL (PRIMARY) HYPERTENSION: ICD-10-CM

## 2025-03-04 DIAGNOSIS — E78.5 HYPERLIPIDEMIA, UNSPECIFIED: ICD-10-CM

## 2025-03-04 LAB — HBA1C MFR BLD HPLC: 5.9

## 2025-03-04 PROCEDURE — G2211 COMPLEX E/M VISIT ADD ON: CPT

## 2025-03-04 PROCEDURE — 83036 HEMOGLOBIN GLYCOSYLATED A1C: CPT | Mod: QW

## 2025-03-04 PROCEDURE — 99214 OFFICE O/P EST MOD 30 MIN: CPT

## 2025-03-05 LAB
CHOLEST SERPL-MCNC: 113 MG/DL
HDLC SERPL-MCNC: 32 MG/DL
LDLC SERPL CALC-MCNC: 56 MG/DL
NONHDLC SERPL-MCNC: 81 MG/DL
T4 FREE SERPL-MCNC: 1.3 NG/DL
TRIGL SERPL-MCNC: 140 MG/DL
TSH SERPL-ACNC: 1.5 UIU/ML

## 2025-08-04 ENCOUNTER — RX RENEWAL (OUTPATIENT)
Age: 70
End: 2025-08-04

## 2025-09-08 ENCOUNTER — APPOINTMENT (OUTPATIENT)
Facility: CLINIC | Age: 70
End: 2025-09-08